# Patient Record
Sex: MALE | Race: ASIAN | NOT HISPANIC OR LATINO | URBAN - METROPOLITAN AREA
[De-identification: names, ages, dates, MRNs, and addresses within clinical notes are randomized per-mention and may not be internally consistent; named-entity substitution may affect disease eponyms.]

---

## 2024-05-17 ENCOUNTER — EMERGENCY (EMERGENCY)
Facility: HOSPITAL | Age: 34
LOS: 1 days | Discharge: HOME | End: 2024-05-17
Attending: EMERGENCY MEDICINE
Payer: COMMERCIAL

## 2024-05-17 VITALS
SYSTOLIC BLOOD PRESSURE: 127 MMHG | RESPIRATION RATE: 16 BRPM | WEIGHT: 199.96 LBS | OXYGEN SATURATION: 97 % | HEART RATE: 89 BPM | DIASTOLIC BLOOD PRESSURE: 76 MMHG | TEMPERATURE: 97 F

## 2024-05-17 VITALS
DIASTOLIC BLOOD PRESSURE: 83 MMHG | OXYGEN SATURATION: 99 % | TEMPERATURE: 99 F | SYSTOLIC BLOOD PRESSURE: 137 MMHG | RESPIRATION RATE: 18 BRPM | HEART RATE: 81 BPM

## 2024-05-17 LAB
ALBUMIN SERPL ELPH-MCNC: 3.8 G/DL — SIGNIFICANT CHANGE UP (ref 3.5–5)
ALP SERPL-CCNC: 80 U/L — SIGNIFICANT CHANGE UP (ref 40–120)
ALT FLD-CCNC: 52 U/L DA — SIGNIFICANT CHANGE UP (ref 10–60)
ANION GAP SERPL CALC-SCNC: 5 MMOL/L — SIGNIFICANT CHANGE UP (ref 5–17)
AST SERPL-CCNC: 31 U/L — SIGNIFICANT CHANGE UP (ref 10–40)
BASOPHILS # BLD AUTO: 0.05 K/UL — SIGNIFICANT CHANGE UP (ref 0–0.2)
BASOPHILS NFR BLD AUTO: 0.7 % — SIGNIFICANT CHANGE UP (ref 0–2)
BILIRUB SERPL-MCNC: 0.2 MG/DL — SIGNIFICANT CHANGE UP (ref 0.2–1.2)
BUN SERPL-MCNC: 7 MG/DL — SIGNIFICANT CHANGE UP (ref 7–18)
CALCIUM SERPL-MCNC: 9.6 MG/DL — SIGNIFICANT CHANGE UP (ref 8.4–10.5)
CHLORIDE SERPL-SCNC: 106 MMOL/L — SIGNIFICANT CHANGE UP (ref 96–108)
CO2 SERPL-SCNC: 25 MMOL/L — SIGNIFICANT CHANGE UP (ref 22–31)
CREAT SERPL-MCNC: 1.06 MG/DL — SIGNIFICANT CHANGE UP (ref 0.5–1.3)
EGFR: 96 ML/MIN/1.73M2 — SIGNIFICANT CHANGE UP
EOSINOPHIL # BLD AUTO: 0.3 K/UL — SIGNIFICANT CHANGE UP (ref 0–0.5)
EOSINOPHIL NFR BLD AUTO: 4 % — SIGNIFICANT CHANGE UP (ref 0–6)
GLUCOSE SERPL-MCNC: 105 MG/DL — HIGH (ref 70–99)
HCT VFR BLD CALC: 39.5 % — SIGNIFICANT CHANGE UP (ref 39–50)
HGB BLD-MCNC: 13.4 G/DL — SIGNIFICANT CHANGE UP (ref 13–17)
IMM GRANULOCYTES NFR BLD AUTO: 0.4 % — SIGNIFICANT CHANGE UP (ref 0–0.9)
LYMPHOCYTES # BLD AUTO: 2.67 K/UL — SIGNIFICANT CHANGE UP (ref 1–3.3)
LYMPHOCYTES # BLD AUTO: 35.6 % — SIGNIFICANT CHANGE UP (ref 13–44)
MCHC RBC-ENTMCNC: 28 PG — SIGNIFICANT CHANGE UP (ref 27–34)
MCHC RBC-ENTMCNC: 33.9 GM/DL — SIGNIFICANT CHANGE UP (ref 32–36)
MCV RBC AUTO: 82.5 FL — SIGNIFICANT CHANGE UP (ref 80–100)
MONOCYTES # BLD AUTO: 0.61 K/UL — SIGNIFICANT CHANGE UP (ref 0–0.9)
MONOCYTES NFR BLD AUTO: 8.1 % — SIGNIFICANT CHANGE UP (ref 2–14)
NEUTROPHILS # BLD AUTO: 3.85 K/UL — SIGNIFICANT CHANGE UP (ref 1.8–7.4)
NEUTROPHILS NFR BLD AUTO: 51.2 % — SIGNIFICANT CHANGE UP (ref 43–77)
NRBC # BLD: 0 /100 WBCS — SIGNIFICANT CHANGE UP (ref 0–0)
PLATELET # BLD AUTO: 201 K/UL — SIGNIFICANT CHANGE UP (ref 150–400)
POTASSIUM SERPL-MCNC: 3.4 MMOL/L — LOW (ref 3.5–5.3)
POTASSIUM SERPL-SCNC: 3.4 MMOL/L — LOW (ref 3.5–5.3)
PROT SERPL-MCNC: 7.3 G/DL — SIGNIFICANT CHANGE UP (ref 6–8.3)
RBC # BLD: 4.79 M/UL — SIGNIFICANT CHANGE UP (ref 4.2–5.8)
RBC # FLD: 13.1 % — SIGNIFICANT CHANGE UP (ref 10.3–14.5)
SODIUM SERPL-SCNC: 136 MMOL/L — SIGNIFICANT CHANGE UP (ref 135–145)
TROPONIN I, HIGH SENSITIVITY RESULT: 3.6 NG/L — SIGNIFICANT CHANGE UP
TROPONIN I, HIGH SENSITIVITY RESULT: <3 NG/L — SIGNIFICANT CHANGE UP
WBC # BLD: 7.51 K/UL — SIGNIFICANT CHANGE UP (ref 3.8–10.5)
WBC # FLD AUTO: 7.51 K/UL — SIGNIFICANT CHANGE UP (ref 3.8–10.5)

## 2024-05-17 PROCEDURE — 80053 COMPREHEN METABOLIC PANEL: CPT

## 2024-05-17 PROCEDURE — 99285 EMERGENCY DEPT VISIT HI MDM: CPT

## 2024-05-17 PROCEDURE — 71046 X-RAY EXAM CHEST 2 VIEWS: CPT

## 2024-05-17 PROCEDURE — 71046 X-RAY EXAM CHEST 2 VIEWS: CPT | Mod: 26

## 2024-05-17 PROCEDURE — 99285 EMERGENCY DEPT VISIT HI MDM: CPT | Mod: 25

## 2024-05-17 PROCEDURE — 96374 THER/PROPH/DIAG INJ IV PUSH: CPT

## 2024-05-17 PROCEDURE — 85025 COMPLETE CBC W/AUTO DIFF WBC: CPT

## 2024-05-17 PROCEDURE — 36415 COLL VENOUS BLD VENIPUNCTURE: CPT

## 2024-05-17 PROCEDURE — 84484 ASSAY OF TROPONIN QUANT: CPT

## 2024-05-17 RX ORDER — FAMOTIDINE 10 MG/ML
20 INJECTION INTRAVENOUS ONCE
Refills: 0 | Status: COMPLETED | OUTPATIENT
Start: 2024-05-17 | End: 2024-05-17

## 2024-05-17 RX ADMIN — FAMOTIDINE 20 MILLIGRAM(S): 10 INJECTION INTRAVENOUS at 15:49

## 2024-05-17 RX ADMIN — Medication 30 MILLILITER(S): at 15:49

## 2024-05-17 NOTE — ED ADULT NURSE NOTE - NSFALLUNIVINTERV_ED_ALL_ED
Bed/Stretcher in lowest position, wheels locked, appropriate side rails in place/Call bell, personal items and telephone in reach/Instruct patient to call for assistance before getting out of bed/chair/stretcher/Non-slip footwear applied when patient is off stretcher/[place] to call system/Physically safe environment - no spills, clutter or unnecessary equipment/Purposeful proactive rounding/Room/bathroom lighting operational, light cord in reach

## 2024-05-17 NOTE — ED PROVIDER NOTE - DIFFERENTIAL DIAGNOSIS
Differential Diagnosis Assessment/plan: Chest pain/abdominal pain concerns include but not limited to cardiac, gastro, infectious,

## 2024-05-17 NOTE — ED PROVIDER NOTE - NSFOLLOWUPCLINICS_GEN_ALL_ED_FT
[facility]  Cardiology  [address] [facility] [address], [address]  [place] [address]  Phone: [phone]  Fax:   Follow Up Time: 1-3 Days

## 2024-05-17 NOTE — ED PROVIDER NOTE - PHYSICAL EXAMINATION
Gen. well-appearing, acute distress male  HEENT: Pharynx is clear  Lungs: Bilateral breath sounds without crackles wheezing or rhonchi  CVS: S1S2   Abd; no guarding no distention soft nontender, no CVA tenderness  Ext: No pitting edema no erythema no calf tenderness  Neuro: Awake alert oriented x 3  MSK: Strength 5/5 bilateral upper and lower extremities

## 2024-05-17 NOTE — ED ADULT NURSE NOTE - OBJECTIVE STATEMENT
Patient presented to ED with SOB and left upper abdominal discomfort since last night, pt not in any form of distress at this time.

## 2024-05-17 NOTE — ED PROVIDER NOTE - CLINICAL SUMMARY MEDICAL DECISION MAKING FREE TEXT BOX
ATTG: : Assessment/plan: Chest pain/abdominal pain concerns include but not limited to cardiac, gastro, infectious, will check chest x-ray, check labs, check EKG, cardiac workup, reeval for dispo

## 2024-05-17 NOTE — ED PROVIDER NOTE - NSFOLLOWUPINSTRUCTIONS_ED_ALL_ED_FT
Your diagnosis this visit was: Chest pain  From this ED visit you were prescribed: No new medications  Please continue taking your prescribed medications as directed  Continue taking over the counter medications such as Acetaminophen and/or Ibuprofen as directed for pain.     You may be contacted by our Emergency Department Referrals Coordinator to set up your follow-up appointment within 24-48 hours of your discharge, Monday through Friday.   We recommend you follow up with: your medical doctor   Recommend he follow-up with a cardiologist.  Please call the number listed above to schedule an appointment.     Please return to the Emergency Department if you experience any of the following symptoms:  - Shortness of breath or trouble breathing  - Pressure, pain, or tightness in the chest  - Face drooping, arm weakness or speech difficulty,  - Persistent or severe vomiting  - Head injury or loss of consciousness  - Nonstop bleeding or an open wound    Nonspecific Chest Pain, Adult  Chest pain is an uncomfortable, tight, or painful feeling in the chest. The pain can feel like a crushing, aching, or squeezing pressure. A person can feel a burning or tingling sensation. Chest pain can also be felt in your back, neck, jaw, shoulder, or arm. This pain can be worse when you move, sneeze, or take a deep breath.    Chest pain can be caused by a condition that is life-threatening. This must be treated right away. It can also be caused by something that is not life-threatening. If you have chest pain, it can be hard to know the difference, so it is important to get help right away to make sure that you do not have a serious condition.    Some life-threatening causes of chest pain include:  Heart attack.  A tear in the body's main blood vessel (aortic dissection).  Inflammation around your heart (pericarditis).  A problem in the lungs, such as a blood clot (pulmonary embolism) or a collapsed lung (pneumothorax).  Some non life-threatening causes of chest pain include:  Heartburn.  Anxiety or stress.  Damage to the bones, muscles, and cartilage that make up your chest wall.  Pneumonia or bronchitis.  Shingles infection (varicella-zoster virus).  Your chest pain may come and go. It may also be constant. Your health care provider will do tests and other studies to find the cause of your pain. Treatment will depend on the cause of your chest pain.    Follow these instructions at home:  Medicines    Take over-the-counter and prescription medicines only as told by your health care provider.  If you were prescribed an antibiotic medicine, take it as told by your health care provider. Do not stop taking the antibiotic even if you start to feel better.  Activity    Avoid any activities that cause chest pain.  Do not lift anything that is heavier than 10 lb (4.5 kg), or the limit that you are told, until your health care provider says that it is safe.  Rest as directed by your health care provider.  Return to your normal activities only as told by your health care provider. Ask your health care provider what activities are safe for you.  Lifestyle    A plate along with examples of foods in a healthy diet.  Silhouette of a person sitting on the floor doing yoga.  Do not use any products that contain nicotine or tobacco, such as cigarettes, e-cigarettes, and chewing tobacco. If you need help quitting, ask your health care provider.  Do not drink alcohol.  Make healthy lifestyle changes as recommended. These may include:  Getting regular exercise. Ask your health care provider to suggest some exercises that are safe for you.  Eating a heart-healthy diet. This includes plenty of fresh fruits and vegetables, whole grains, low-fat (lean) protein, and low-fat dairy products. A dietitian can help you find healthy eating options.  Maintaining a healthy weight.  Managing any other health conditions you may have, such as high blood pressure (hypertension) or diabetes.  Reducing stress, such as with yoga or relaxation techniques.  General instructions    Pay attention to any changes in your symptoms.  It is up to you to get the results of any tests that were done. Ask your health care provider, or the department that is doing the tests, when your results will be ready.  Keep all follow-up visits as told by your health care provider. This is important.  You may be asked to go for further testing if your chest pain does not go away.  Contact a health care provider if:  Your chest pain does not go away.  You feel depressed.  You have a fever.  You notice changes in your symptoms or develop new symptoms.  Get help right away if:  Your chest pain gets worse.  You have a cough that gets worse, or you cough up blood.  You have severe pain in your abdomen.  You faint.  You have sudden, unexplained chest discomfort.  You have sudden, unexplained discomfort in your arms, back, neck, or jaw.  You have shortness of breath at any time.  You suddenly start to sweat, or your skin gets clammy.  You feel nausea or you vomit.  You suddenly feel lightheaded or dizzy.  You have severe weakness, or unexplained weakness or fatigue.  Your heart begins to beat quickly, or it feels like it is skipping beats.  These symptoms may represent a serious problem that is an emergency. Do not wait to see if the symptoms will go away. Get medical help right away. Call your local emergency services (911 in the [place] Do not drive yourself to the hospital.    Summary  Chest pain can be caused by a condition that is serious and requires urgent treatment. It may also be caused by something that is not life-threatening.  Your health care provider may do lab tests and other studies to find the cause of your pain.  Follow your health care provider's instructions on taking medicines, making lifestyle changes, and getting emergency treatment if symptoms become worse.  Keep all follow-up visits as told by your health care provider. This includes visits for any further testing if your chest pain does not go away.  This information is not intended to replace advice given to you by your health care provider. Make sure you discuss any questions you have with your health care provider.

## 2024-05-17 NOTE — ED PROVIDER NOTE - PATIENT PORTAL LINK FT
You can access the FollowMyHealth Patient Portal offered by [facility] by registering at the following website: [email or web address]. By joining [email or web address]’s FollowMyHealth portal, you will also be able to view your health information using other applications (apps) compatible with our system.

## 2024-05-17 NOTE — ED PROVIDER NOTE - OBJECTIVE STATEMENT
33-year-old male with PMHx of HLD, GERD, [facility] emergency department for chest pain associated with shortness of breath.  Pain began last evening on the left side.  Nonradiating.  Not associated with heart palpitations or diaphoresis.  Edition 3 days ago he had left upper abdominal pain.  Believes that this was a recurrence of his GERD.  No chest pain currently.  No fever or chills.  Quit smoking 4 years ago.  Father had an MI at age 55.  He has seen a cardiologist and had an echo done approximately a year ago.  No associated cough or shortness of breath.  No leg swelling.  No history of PE or DVT.  No recent travel.  Wife is with him at the bedside. 33-year-old male with PMHx of HLD, GERD, presents to the emergency department for chest pain associated with shortness of breath.  Pain began last evening on the left side.  Non-radiating.  Not associated with heart palpitations or diaphoresis.  Edition 3 days ago he had left upper abdominal pain.  Believes that this was a recurrence of his GERD.  No chest pain currently.  No fever or chills.  Quit smoking 4 years ago.  Father had an MI at age 55.  He has seen a cardiologist and had an echo done approximately a year ago.  No associated cough or shortness of breath.  No leg swelling.  No history of PE or DVT.  No recent travel.  Wife is with him at the bedside.

## 2024-05-17 NOTE — ED PROVIDER NOTE - PROGRESS NOTE DETAILS
ATTG: : Reassessment note: Patient without chest pain.  Has follow-up with his cardiologist on Monday.  Labs are reviewed and all questions were answered.  Copy was provided.  DC home with return precautions.

## 2024-05-17 NOTE — ED PROVIDER NOTE - NS ED ROS FT
Constitutional: no fever no chills  Eyes: no conjunctivitis  Ears: no ear pain no tinnitus  Nose: no nasal congestion, Mouth/Throat: no throat pain, Neck: no stiffness  Cardiovascular: see hpi  Respiratory: no shortness of breath no cough  Gastrointestinal: see hpi, no vomiting no diarrhea  MSK: no joint pain  : no  dysuria  Skin: no rash  Neuro: no LOC no seizures

## 2024-10-05 NOTE — ED PROVIDER NOTE - CONDITION AT DISCHARGE:
Spoke to pt on results. Pt understood. Pt will schedule another ultrasound. If you can place the order.   Improved